# Patient Record
Sex: FEMALE | Race: WHITE | NOT HISPANIC OR LATINO | Employment: UNEMPLOYED | URBAN - METROPOLITAN AREA
[De-identification: names, ages, dates, MRNs, and addresses within clinical notes are randomized per-mention and may not be internally consistent; named-entity substitution may affect disease eponyms.]

---

## 2022-06-15 ENCOUNTER — OFFICE VISIT (OUTPATIENT)
Dept: DERMATOLOGY | Facility: CLINIC | Age: 52
End: 2022-06-15
Payer: COMMERCIAL

## 2022-06-15 DIAGNOSIS — L81.1 MELASMA: ICD-10-CM

## 2022-06-15 DIAGNOSIS — L81.4 LENTIGO: Primary | ICD-10-CM

## 2022-06-15 DIAGNOSIS — I83.91 SPIDER VEIN OF RIGHT LOWER EXTREMITY: ICD-10-CM

## 2022-06-15 PROCEDURE — 99204 OFFICE O/P NEW MOD 45 MIN: CPT | Mod: S$GLB,,, | Performed by: DERMATOLOGY

## 2022-06-15 PROCEDURE — 99999 PR PBB SHADOW E&M-NEW PATIENT-LVL III: ICD-10-PCS | Mod: PBBFAC,,, | Performed by: DERMATOLOGY

## 2022-06-15 PROCEDURE — 1160F RVW MEDS BY RX/DR IN RCRD: CPT | Mod: CPTII,S$GLB,, | Performed by: DERMATOLOGY

## 2022-06-15 PROCEDURE — 1160F PR REVIEW ALL MEDS BY PRESCRIBER/CLIN PHARMACIST DOCUMENTED: ICD-10-PCS | Mod: CPTII,S$GLB,, | Performed by: DERMATOLOGY

## 2022-06-15 PROCEDURE — 1159F MED LIST DOCD IN RCRD: CPT | Mod: CPTII,S$GLB,, | Performed by: DERMATOLOGY

## 2022-06-15 PROCEDURE — 99204 PR OFFICE/OUTPT VISIT, NEW, LEVL IV, 45-59 MIN: ICD-10-PCS | Mod: S$GLB,,, | Performed by: DERMATOLOGY

## 2022-06-15 PROCEDURE — 1159F PR MEDICATION LIST DOCUMENTED IN MEDICAL RECORD: ICD-10-PCS | Mod: CPTII,S$GLB,, | Performed by: DERMATOLOGY

## 2022-06-15 PROCEDURE — 99999 PR PBB SHADOW E&M-NEW PATIENT-LVL III: CPT | Mod: PBBFAC,,, | Performed by: DERMATOLOGY

## 2022-06-15 RX ORDER — FAMOTIDINE 10 MG/1
TABLET ORAL
COMMUNITY

## 2022-06-15 RX ORDER — CHOLECALCIFEROL (VITAMIN D3) 50 MCG
TABLET ORAL
COMMUNITY

## 2022-06-15 RX ORDER — FLUTICASONE PROPIONATE 50 MCG
SPRAY, SUSPENSION (ML) NASAL
COMMUNITY

## 2022-06-15 RX ORDER — ESTRADIOL 0.1 MG/G
CREAM VAGINAL
COMMUNITY

## 2022-06-15 RX ORDER — UBIDECARENONE 75 MG
CAPSULE ORAL
COMMUNITY

## 2022-06-15 RX ORDER — IBUPROFEN 100 MG/5ML
SUSPENSION, ORAL (FINAL DOSE FORM) ORAL
COMMUNITY

## 2022-06-15 NOTE — PROGRESS NOTES
"  Subjective:       Patient ID:  Cookie Warren is a 51 y.o. female who presents for   Chief Complaint   Patient presents with    Spot     FACE     Pt c/o brown spots to face x 2-3yrs. Prev tx with OTC lightening cream. Does wear spf but has a lot of sun exposure now     Pt also c/o "bad veins" to legs she would like to have looked at.    Pt also states she had rash in her scalp that has now resolved. Believes it was from a hair dye she had prev used. It caused hives around 12/2021 it has resolved.  Uses sensitive skin products now.     Spot        Review of Systems   Skin: Positive for daily sunscreen use and activity-related sunscreen use. Negative for tendency to form keloidal scars.   Hematologic/Lymphatic: Bruises/bleeds easily (bruise).        Objective:    Physical Exam   Constitutional: She appears well-developed and well-nourished. No distress.   Neurological: She is alert and oriented to person, place, and time. She is not disoriented.   Psychiatric: She has a normal mood and affect.   Skin:   Areas Examined (abnormalities noted in diagram):   Head / Face Inspection Performed  RLE Inspected  LLE Inspection Performed                   Diagram Legend     Erythematous scaling macule/papule c/w actinic keratosis       Vascular papule c/w angioma      Pigmented verrucoid papule/plaque c/w seborrheic keratosis      Yellow umbilicated papule c/w sebaceous hyperplasia      Irregularly shaped tan macule c/w lentigo     1-2 mm smooth white papules consistent with Milia      Movable subcutaneous cyst with punctum c/w epidermal inclusion cyst      Subcutaneous movable cyst c/w pilar cyst      Firm pink to brown papule c/w dermatofibroma      Pedunculated fleshy papule(s) c/w skin tag(s)      Evenly pigmented macule c/w junctional nevus     Mildly variegated pigmented, slightly irregular-bordered macule c/w mildly atypical nevus      Flesh colored to evenly pigmented papule c/w intradermal nevus       Pink pearly " papule/plaque c/w basal cell carcinoma      Erythematous hyperkeratotic cursted plaque c/w SCC      Surgical scar with no sign of skin cancer recurrence      Open and closed comedones      Inflammatory papules and pustules      Verrucoid papule consistent consistent with wart     Erythematous eczematous patches and plaques     Dystrophic onycholytic nail with subungual debris c/w onychomycosis     Umbilicated papule    Erythematous-base heme-crusted tan verrucoid plaque consistent with inflamed seborrheic keratosis     Erythematous Silvery Scaling Plaque c/w Psoriasis     See annotation      Assessment / Plan:        Lentigo  This is a benign hyperpigmented sun induced lesion. Recommend daily sun protection/avoidance and use of at least SPF 30, broad spectrum sunscreen (OTC drug) will reduce the number of new lesions. Treatment of these benign lesions are considered cosmetic.    The nature of sun-induced photo-aging and skin cancers is discussed.  Sun avoidance, protective clothing, and the use of 30-SPF sunscreens is advised. Observe closely for skin damage/changes, and call if such occurs.    Melasma  -     hydroquinone 8 % Emul; Compound hydroquinone 8% / tretinoin 0.025% / kojic acid 1% / niacinamide 4% / fluocinolone 0.025% cream. Apply a pea-sized amount to face qhs. Do not use for longer than 12 weeks in a row.  Dispense: 30 g; Refill: 1  PM:  1. Wash with Revision skin brightening wash or other glycolic acid wash such as Glytone- many at [facility] available over the counter  2. Thin film of hydroquinone compound on every other to every night   3. Moisturize with cerave pm or vanicream daily moisturizer to minimize irritation    AM:  1. Wash with mild cleanser like vanicream daily facial cleanser or cerave hydrating cleanser  2. Vitamin C serum over face- Revision, [facility], or OTC Cerave or LaRoche Posay Vitamin C serum  3. Moisturizer with spf 30+     A tint is recommended too- such as makeup, tinted  sunscreen, BB/CC creams. The tint in products protects against agents other than UV light that damage our skin such as blue light from screens, infrared heat, visible light, and other  other environmental pollutants.      Spider vein of right lower extremity  Refer to [facility] or [facility]           Follow up in about 3 months (around 9/15/2022) for recheck face.

## 2022-06-15 NOTE — PATIENT INSTRUCTIONS
PM:  Wash with Revision skin brightening wash or other glycolic acid wash such as Glytone- many at [facility] available over the counter  Thin film of hydroquinone compound on every other to every night   Moisturize with cerave pm or vanicream daily moisturizer to minimize irritation    AM:  Wash with mild cleanser like vanicream daily facial cleanser or cerave hydrating cleanser  2. Vitamin C serum over face- Revision, [employer], or OTC Cerave or LaRoche Posay Vitamin C serum  3. Moisturizer with spf 30+     A tint is recommended too- such as makeup, tinted sunscreen, BB/CC creams. The tint in products protects against agents other than UV light that damage our skin such as blue light from screens, infrared heat, visible light, and other  other environmental pollutants.

## 2022-09-15 ENCOUNTER — OFFICE VISIT (OUTPATIENT)
Dept: DERMATOLOGY | Facility: CLINIC | Age: 52
End: 2022-09-15
Payer: COMMERCIAL

## 2022-09-15 DIAGNOSIS — L81.1 MELASMA: Primary | ICD-10-CM

## 2022-09-15 PROCEDURE — 99214 PR OFFICE/OUTPT VISIT, EST, LEVL IV, 30-39 MIN: ICD-10-PCS | Mod: S$GLB,,, | Performed by: DERMATOLOGY

## 2022-09-15 PROCEDURE — 99999 PR PBB SHADOW E&M-EST. PATIENT-LVL III: CPT | Mod: PBBFAC,,, | Performed by: DERMATOLOGY

## 2022-09-15 PROCEDURE — 99999 PR PBB SHADOW E&M-EST. PATIENT-LVL III: ICD-10-PCS | Mod: PBBFAC,,, | Performed by: DERMATOLOGY

## 2022-09-15 PROCEDURE — 99214 OFFICE O/P EST MOD 30 MIN: CPT | Mod: S$GLB,,, | Performed by: DERMATOLOGY

## 2022-09-15 PROCEDURE — 1159F MED LIST DOCD IN RCRD: CPT | Mod: CPTII,S$GLB,, | Performed by: DERMATOLOGY

## 2022-09-15 PROCEDURE — 1159F PR MEDICATION LIST DOCUMENTED IN MEDICAL RECORD: ICD-10-PCS | Mod: CPTII,S$GLB,, | Performed by: DERMATOLOGY

## 2022-09-15 PROCEDURE — 1160F RVW MEDS BY RX/DR IN RCRD: CPT | Mod: CPTII,S$GLB,, | Performed by: DERMATOLOGY

## 2022-09-15 PROCEDURE — 1160F PR REVIEW ALL MEDS BY PRESCRIBER/CLIN PHARMACIST DOCUMENTED: ICD-10-PCS | Mod: CPTII,S$GLB,, | Performed by: DERMATOLOGY

## 2022-09-15 RX ORDER — TRETINOIN 0.25 MG/G
CREAM TOPICAL
Qty: 30 G | Refills: 5 | Status: SHIPPED | OUTPATIENT
Start: 2022-09-15

## 2022-09-15 NOTE — PROGRESS NOTES
Subjective:       Patient ID:  Cookie Warren is a 52 y.o. female who presents for   Chief Complaint   Patient presents with    Follow-up     Pt here for f/u of brown spots to face which she has had for 2-3 years. . Area is doing better and pt is following regimen. Pt washing with glytone , using [facility] fade cream with HQ and sunscreen with tint in am and VIt C lotion         Follow-up  Pt here for f/u of brown spots to face. Area is doing better and pt is following regimen.     Review of Systems   Skin:  Positive for daily sunscreen use and activity-related sunscreen use. Negative for recent sunburn and wears hat.      Objective:    Physical Exam   Constitutional: She appears well-developed and well-nourished. No distress.   Neurological: She is alert and oriented to person, place, and time. She is not disoriented.   Psychiatric: She has a normal mood and affect.   Skin:   Areas Examined (abnormalities noted in diagram):   Head / Face Inspection Performed            Diagram Legend     Erythematous scaling macule/papule c/w actinic keratosis       Vascular papule c/w angioma      Pigmented verrucoid papule/plaque c/w seborrheic keratosis      Yellow umbilicated papule c/w sebaceous hyperplasia      Irregularly shaped tan macule c/w lentigo     1-2 mm smooth white papules consistent with Milia      Movable subcutaneous cyst with punctum c/w epidermal inclusion cyst      Subcutaneous movable cyst c/w pilar cyst      Firm pink to brown papule c/w dermatofibroma      Pedunculated fleshy papule(s) c/w skin tag(s)      Evenly pigmented macule c/w junctional nevus     Mildly variegated pigmented, slightly irregular-bordered macule c/w mildly atypical nevus      Flesh colored to evenly pigmented papule c/w intradermal nevus       Pink pearly papule/plaque c/w basal cell carcinoma      Erythematous hyperkeratotic cursted plaque c/w SCC      Surgical scar with no sign of skin cancer recurrence      Open and closed  comedones      Inflammatory papules and pustules      Verrucoid papule consistent consistent with wart     Erythematous eczematous patches and plaques     Dystrophic onycholytic nail with subungual debris c/w onychomycosis     Umbilicated papule    Erythematous-base heme-crusted tan verrucoid plaque consistent with inflamed seborrheic keratosis     Erythematous Silvery Scaling Plaque c/w Psoriasis     See annotation      Assessment / Plan:        Melasma  D/C hydroquinone formulation for now as has used for 3 months consistently. Will maintain with below meds and resume HQ if needed in a few months if repigmentation occurs.   -     tretinoin (RETIN-A) 0.025 % cream; Compound tretinoin 0.025% / azelaic acid 8% / niacinamide 2% cream. Apply a pea-sized amount to entire face qhs.  Dispense: 30 g; Refill: 5  PM:  Wash with Revision skin brightening wash or other glycolic acid wash such as Glytone- many at [facility] available over the counter  Thin film of tretinoin/azaleic acid  all over every other to every night   Moisturize with cerave pm or vanicream daily moisturizer to minimize irritation    AM:  Wash with mild cleanser like vanicream daily facial cleanser or cerave hydrating cleanser  2. Vitamin C serum over face- Revision, [employer], or OTC Cerave or LaRoche Posay Vitamin C serum  3. Moisturizer with spf 30+     A tint is recommended too- such as makeup, tinted sunscreen, BB/CC creams. The tint in products protects against agents other than UV light that damage our skin such as blue light from screens, infrared heat, visible light, and other  other environmental pollutants.             Follow up in about 6 months (around 3/15/2023) for recheck face .

## 2022-09-15 NOTE — PATIENT INSTRUCTIONS
PM:  Wash with Revision skin brightening wash or other glycolic acid wash such as Glytone- many at [facility] available over the counter  Thin film of tretinoin/azaleic acid  all over every other to every night   Moisturize with cerave pm or vanicream daily moisturizer to minimize irritation    AM:  Wash with mild cleanser like vanicream daily facial cleanser or cerave hydrating cleanser  2. Vitamin C serum over face- Revision, [employer], or OTC Cerave or LaRoche Posay Vitamin C serum  3. Moisturizer with spf 30+     A tint is recommended too- such as makeup, tinted sunscreen, BB/CC creams. The tint in products protects against agents other than UV light that damage our skin such as blue light from screens, infrared heat, visible light, and other  other environmental pollutants.

## 2023-02-20 ENCOUNTER — PATIENT MESSAGE (OUTPATIENT)
Dept: DERMATOLOGY | Facility: CLINIC | Age: 53
End: 2023-02-20
Payer: COMMERCIAL

## 2023-02-27 ENCOUNTER — PATIENT MESSAGE (OUTPATIENT)
Dept: DERMATOLOGY | Facility: CLINIC | Age: 53
End: 2023-02-27
Payer: COMMERCIAL

## 2023-06-08 ENCOUNTER — OFFICE VISIT (OUTPATIENT)
Dept: DERMATOLOGY | Facility: CLINIC | Age: 53
End: 2023-06-08
Payer: COMMERCIAL

## 2023-06-08 DIAGNOSIS — L81.1 MELASMA: Primary | ICD-10-CM

## 2023-06-08 DIAGNOSIS — L70.0 ACNE VULGARIS: ICD-10-CM

## 2023-06-08 PROCEDURE — 99214 PR OFFICE/OUTPT VISIT, EST, LEVL IV, 30-39 MIN: ICD-10-PCS | Mod: S$GLB,,, | Performed by: DERMATOLOGY

## 2023-06-08 PROCEDURE — 1160F PR REVIEW ALL MEDS BY PRESCRIBER/CLIN PHARMACIST DOCUMENTED: ICD-10-PCS | Mod: CPTII,S$GLB,, | Performed by: DERMATOLOGY

## 2023-06-08 PROCEDURE — 99999 PR PBB SHADOW E&M-EST. PATIENT-LVL III: ICD-10-PCS | Mod: PBBFAC,,, | Performed by: DERMATOLOGY

## 2023-06-08 PROCEDURE — 99214 OFFICE O/P EST MOD 30 MIN: CPT | Mod: S$GLB,,, | Performed by: DERMATOLOGY

## 2023-06-08 PROCEDURE — 1159F MED LIST DOCD IN RCRD: CPT | Mod: CPTII,S$GLB,, | Performed by: DERMATOLOGY

## 2023-06-08 PROCEDURE — 1159F PR MEDICATION LIST DOCUMENTED IN MEDICAL RECORD: ICD-10-PCS | Mod: CPTII,S$GLB,, | Performed by: DERMATOLOGY

## 2023-06-08 PROCEDURE — 1160F RVW MEDS BY RX/DR IN RCRD: CPT | Mod: CPTII,S$GLB,, | Performed by: DERMATOLOGY

## 2023-06-08 PROCEDURE — 99999 PR PBB SHADOW E&M-EST. PATIENT-LVL III: CPT | Mod: PBBFAC,,, | Performed by: DERMATOLOGY

## 2023-06-08 NOTE — PATIENT INSTRUCTIONS
Hydroquinone product from [facility] - use every other night for 3 months then take  a 2-3 month break  Product from [facility] can be used alternating with [facility] hydroquinone and then used regularly for maintenance      PM:  Wash with Revision skin brightening wash or other glycolic acid wash such as Glytone- many at [facility] available over the counter  Thin film of Hydroquinone from  or tretinoin from [facility] all over every other to every night   Moisturize with cerave pm or vanicream daily moisturizer to minimize irritation    AM:  Wash with mild cleanser like vanicream daily facial cleanser or cerave hydrating cleanser  2. Vitamin C serum over face- Revision, Skin [employer], or OTC Cerave or LaRoche Posay Vitamin C serum  3. Moisturizer with spf 30+     A tint is recommended too- such as makeup, tinted sunscreen, BB/CC creams. The tint in products protects against agents other than UV light that damage our skin such as blue light from screens, infrared heat, visible light, and other  other environmental pollutants.

## 2023-06-08 NOTE — PROGRESS NOTES
Subjective:      Patient ID:  Cookie Warren is a 52 y.o. female who presents for   Chief Complaint   Patient presents with    Follow-up     Patient is here today for follow up to recheck face. Pt states that sun spots has remained the same but overall skin has improved significantly. Pt states that she has been following the regimen given.   Pt is using tretinoin 0.025% cream/azeliec acid 8%/niacinamide regularly.  Was on HQ then stopped and is maintained on prev rx.     Review of Systems   Skin:  Positive for daily sunscreen use, activity-related sunscreen use and recent sunburn. Negative for wears hat.     Objective:   Physical Exam   Constitutional: She appears well-developed and well-nourished. No distress.   Neurological: She is alert and oriented to person, place, and time. She is not disoriented.   Psychiatric: She has a normal mood and affect.   Skin:   Areas Examined (abnormalities noted in diagram):   Head / Face Inspection Performed          Diagram Legend     Erythematous scaling macule/papule c/w actinic keratosis       Vascular papule c/w angioma      Pigmented verrucoid papule/plaque c/w seborrheic keratosis      Yellow umbilicated papule c/w sebaceous hyperplasia      Irregularly shaped tan macule c/w lentigo     1-2 mm smooth white papules consistent with Milia      Movable subcutaneous cyst with punctum c/w epidermal inclusion cyst      Subcutaneous movable cyst c/w pilar cyst      Firm pink to brown papule c/w dermatofibroma      Pedunculated fleshy papule(s) c/w skin tag(s)      Evenly pigmented macule c/w junctional nevus     Mildly variegated pigmented, slightly irregular-bordered macule c/w mildly atypical nevus      Flesh colored to evenly pigmented papule c/w intradermal nevus       Pink pearly papule/plaque c/w basal cell carcinoma      Erythematous hyperkeratotic cursted plaque c/w SCC      Surgical scar with no sign of skin cancer recurrence      Open and closed comedones       Inflammatory papules and pustules      Verrucoid papule consistent consistent with wart     Erythematous eczematous patches and plaques     Dystrophic onycholytic nail with subungual debris c/w onychomycosis     Umbilicated papule    Erythematous-base heme-crusted tan verrucoid plaque consistent with inflamed seborrheic keratosis     Erythematous Silvery Scaling Plaque c/w Psoriasis     See annotation      Assessment / Plan:        Melasma  Acne vulgaris  Hydroquinone product from [facility] - use every other night for 3 months then take  a 2-3 month break  Product from [facility] can be used alternating with [facility] hydroquinone and then used regularly for maintenance      PM:  Wash with Revision skin brightening wash or other glycolic acid wash such as Glytone- many at [facility] available over the counter  Thin film of Hydroquinone from  or tretinoin from [facility] all over every other to every night - [facility] #26- hydroquinone 8%/tretinoin 0.025%  Moisturize with cerave pm or vanicream daily moisturizer to minimize irritation    AM:  Wash with mild cleanser like vanicream daily facial cleanser or cerave hydrating cleanser  2. Vitamin C serum over face- Revision, [employer], or OTC Cerave or LaRoche Posay Vitamin C serum  3. Moisturizer with spf 30+     A tint is recommended too- such as makeup, tinted sunscreen, BB/CC creams. The tint in products protects against agents other than UV light that damage our skin such as blue light from screens, infrared heat, visible light, and other  other environmental pollutants.               Follow up in about 1 year (around 6/8/2024) for Medication Management.

## 2024-06-13 ENCOUNTER — OFFICE VISIT (OUTPATIENT)
Dept: DERMATOLOGY | Facility: CLINIC | Age: 54
End: 2024-06-13
Payer: COMMERCIAL

## 2024-06-13 DIAGNOSIS — L81.1 MELASMA: ICD-10-CM

## 2024-06-13 DIAGNOSIS — L71.9 ROSACEA: Primary | ICD-10-CM

## 2024-06-13 PROCEDURE — 1160F RVW MEDS BY RX/DR IN RCRD: CPT | Mod: CPTII,S$GLB,, | Performed by: DERMATOLOGY

## 2024-06-13 PROCEDURE — 99214 OFFICE O/P EST MOD 30 MIN: CPT | Mod: S$GLB,,, | Performed by: DERMATOLOGY

## 2024-06-13 PROCEDURE — 99999 PR PBB SHADOW E&M-EST. PATIENT-LVL III: CPT | Mod: PBBFAC,,, | Performed by: DERMATOLOGY

## 2024-06-13 PROCEDURE — 1159F MED LIST DOCD IN RCRD: CPT | Mod: CPTII,S$GLB,, | Performed by: DERMATOLOGY

## 2024-06-13 RX ORDER — TRETINOIN 0.25 MG/G
CREAM TOPICAL
Qty: 30 G | Refills: 5 | Status: SHIPPED | OUTPATIENT
Start: 2024-06-13

## 2024-06-13 RX ORDER — IVERMECTIN 10 MG/G
CREAM TOPICAL
Qty: 30 G | Refills: 5 | Status: SHIPPED | OUTPATIENT
Start: 2024-06-13

## 2024-06-13 NOTE — PATIENT INSTRUCTIONS
PM:  Wash with revision skin brightening or glycolic acid wash from [facility]  Thin film of Tret/azaleic acid  all over every other to every night   Moisturize with cerave pm or vanicream daily moisturizer to minimize irritation    AM:  Wash with mild cleanser  2. Spot treat with soolantra/metrocream  Vitamin C serum  3. Moisturizer with spf 30 +     A tint is recommended too- such as makeup, tinted sunscreen, BB/CC creams. The iron oxide in the tint of products protects against agents other than UV light that damage our skin such as blue light from screens, infrared heat, visible light, and other other environmental pollutants.  These other factors can contribute significantly to irregular pigment, especially in skin of color and tint helps protect from these factors.

## 2024-06-13 NOTE — PROGRESS NOTES
Subjective:      Patient ID:  Cookie Warren is a 53 y.o. female who presents for   Chief Complaint   Patient presents with    Follow-up     Melasma      Pt here today for melasma f/u - overall better per pt  Pt also states she was told she has rosacea by another derm provider has been using Opzelura and soolantra in AM as well  Pt states she needs refills on hydroquinone    PM:  1. Wash with Revision skin brightening wash or other glycolic acid wash such as Glytone- many at [facility] available over the counter  2. Thin film of Hydroquinone from [facility] or tretinoin from [facility] all over every other to every night   3. Moisturize with cerave pm or vanicream daily moisturizer to minimize irritation     AM:  1. Wash with mild cleanser like vanicream daily facial cleanser or cerave hydrating cleanser  2. Vitamin C serum over face- Revision, [employer], or OTC Cerave or LaRoche Posay Vitamin C serum  3. Moisturizer with spf 30+      A tint is recommended too- such as makeup, tinted sunscreen, BB/CC creams.          Follow-up      Review of Systems   Skin:  Positive for daily sunscreen use and activity-related sunscreen use. Negative for recent sunburn and wears hat.       Objective:   Physical Exam   Constitutional: She appears well-developed and well-nourished. No distress.   Neurological: She is alert and oriented to person, place, and time. She is not disoriented.   Psychiatric: She has a normal mood and affect.   Skin:   Areas Examined (abnormalities noted in diagram):   Head / Face Inspection Performed            Diagram Legend     Erythematous scaling macule/papule c/w actinic keratosis       Vascular papule c/w angioma      Pigmented verrucoid papule/plaque c/w seborrheic keratosis      Yellow umbilicated papule c/w sebaceous hyperplasia      Irregularly shaped tan macule c/w lentigo     1-2 mm smooth white papules consistent with Milia      Movable subcutaneous cyst with punctum c/w epidermal inclusion cyst       Subcutaneous movable cyst c/w pilar cyst      Firm pink to brown papule c/w dermatofibroma      Pedunculated fleshy papule(s) c/w skin tag(s)      Evenly pigmented macule c/w junctional nevus     Mildly variegated pigmented, slightly irregular-bordered macule c/w mildly atypical nevus      Flesh colored to evenly pigmented papule c/w intradermal nevus       Pink pearly papule/plaque c/w basal cell carcinoma      Erythematous hyperkeratotic cursted plaque c/w SCC      Surgical scar with no sign of skin cancer recurrence      Open and closed comedones      Inflammatory papules and pustules      Verrucoid papule consistent consistent with wart     Erythematous eczematous patches and plaques     Dystrophic onycholytic nail with subungual debris c/w onychomycosis     Umbilicated papule    Erythematous-base heme-crusted tan verrucoid plaque consistent with inflamed seborrheic keratosis     Erythematous Silvery Scaling Plaque c/w Psoriasis     See annotation      Assessment / Plan:        Rosacea  -Melasma  -     tretinoin (RETIN-A) 0.025 % cream; Compound tretinoin 0.025% / azelaic acid 8% / niacinamide 2% cream. Apply a pea-sized amount to entire face qhs.  Dispense: 30 g; Refill: 5  PM:  Wash with revision skin brightening or glycolic acid wash from [facility]  Thin film of Tret/azaleic acid  all over every other to every night   Moisturize with cerave pm or vanicream daily moisturizer to minimize irritation    AM:  Wash with mild cleanser  2. Spot treat with soolantra/metrocream  Vitamin C serum  3. Moisturizer with spf 30 +     A tint is recommended too- such as makeup, tinted sunscreen, BB/CC creams. The iron oxide in the tint of products protects against agents other than UV light that damage our skin such as blue light from screens, infrared heat, visible light, and other other environmental pollutants.  These other factors can contribute significantly to irregular pigment, especially in skin of color and tint helps  protect from these factors.      Use Opzelura just for facial rashes, not for rosacea or melasma           Follow up in about 5 months (around 11/13/2024) for Medication Management. Resume HQ in fall if needed

## 2024-10-09 ENCOUNTER — OFFICE VISIT (OUTPATIENT)
Dept: DERMATOLOGY | Facility: CLINIC | Age: 54
End: 2024-10-09
Payer: COMMERCIAL

## 2024-10-09 DIAGNOSIS — L81.1 MELASMA: ICD-10-CM

## 2024-10-09 DIAGNOSIS — L71.9 ROSACEA: Primary | ICD-10-CM

## 2024-10-09 PROCEDURE — 1160F RVW MEDS BY RX/DR IN RCRD: CPT | Mod: CPTII,S$GLB,, | Performed by: DERMATOLOGY

## 2024-10-09 PROCEDURE — 99999 PR PBB SHADOW E&M-EST. PATIENT-LVL III: CPT | Mod: PBBFAC,,, | Performed by: DERMATOLOGY

## 2024-10-09 PROCEDURE — 99213 OFFICE O/P EST LOW 20 MIN: CPT | Mod: S$GLB,,, | Performed by: DERMATOLOGY

## 2024-10-09 PROCEDURE — 1159F MED LIST DOCD IN RCRD: CPT | Mod: CPTII,S$GLB,, | Performed by: DERMATOLOGY

## 2024-10-09 NOTE — PATIENT INSTRUCTIONS
PM:  Wash with Revision skin brightening wash or other glycolic acid wash such as Glytone- many at [facility] available over the counter  Thin film of OTC retinol  all over every other to every night   Moisturize with cerave pm or vanicream daily moisturizer to minimize irritation    AM:  Wash with mild cleanser like vanicream daily facial cleanser or cerave hydrating cleanser  2. Vitamin C serum over face- Revision, [employer], or OTC Cerave or LaRoche Posay Vitamin C serum  Can spot treat with soolantra/metronidazole  3. Moisturizer with spf 30+     A tint is recommended too- such as makeup, tinted sunscreen, BB/CC creams. The tint in products protects against agents other than UV light that damage our skin such as blue light from screens, infrared heat, visible light, and other  other environmental pollutants.        Plan to increase to plain tretinoin ( no azaleic acid) if tolerating OTC retinol

## 2024-10-09 NOTE — PROGRESS NOTES
Subjective:      Patient ID:  Cookie Warren is a 54 y.o. female who presents for   Chief Complaint   Patient presents with    Follow-up     Rosacea - better  Melasma - same      Pt here today for rosacea and melasma f/u. Pt states rosacea is better, melasma is the same. Pt states the tret/azaleic acid flares her rosacea. So now she does not have anything to treat the brown areas.     PM:  1. Wash with glycolic acid wash from [employer]  2. Moisturize with cerave pm or vanicream daily moisturizer to minimize irritation     AM:  1. Wash with mild cleanser  2. Spot treat with soolantra/metrocream- if needed   Vitamin C serum  3. Moisturizer with spf 30 +      Pt states she does use the opzelura if she has a flare and it helps.     Follow-up      Review of Systems   Skin:  Positive for daily sunscreen use and activity-related sunscreen use. Negative for recent sunburn and wears hat.       Objective:   Physical Exam   Constitutional: She appears well-developed and well-nourished. No distress.   Neurological: She is alert and oriented to person, place, and time. She is not disoriented.   Psychiatric: She has a normal mood and affect.   Skin:   Areas Examined (abnormalities noted in diagram):   Head / Face Inspection Performed            Diagram Legend     Erythematous scaling macule/papule c/w actinic keratosis       Vascular papule c/w angioma      Pigmented verrucoid papule/plaque c/w seborrheic keratosis      Yellow umbilicated papule c/w sebaceous hyperplasia      Irregularly shaped tan macule c/w lentigo     1-2 mm smooth white papules consistent with Milia      Movable subcutaneous cyst with punctum c/w epidermal inclusion cyst      Subcutaneous movable cyst c/w pilar cyst      Firm pink to brown papule c/w dermatofibroma      Pedunculated fleshy papule(s) c/w skin tag(s)      Evenly pigmented macule c/w junctional nevus     Mildly variegated pigmented, slightly irregular-bordered macule c/w mildly atypical nevus       Flesh colored to evenly pigmented papule c/w intradermal nevus       Pink pearly papule/plaque c/w basal cell carcinoma      Erythematous hyperkeratotic cursted plaque c/w SCC      Surgical scar with no sign of skin cancer recurrence      Open and closed comedones      Inflammatory papules and pustules      Verrucoid papule consistent consistent with wart     Erythematous eczematous patches and plaques     Dystrophic onycholytic nail with subungual debris c/w onychomycosis     Umbilicated papule    Erythematous-base heme-crusted tan verrucoid plaque consistent with inflamed seborrheic keratosis     Erythematous Silvery Scaling Plaque c/w Psoriasis     See annotation      Assessment / Plan:        Rosacea  Melasma  PM:  Wash with Revision skin brightening wash or other glycolic acid wash such as Glytone- many at [employer] available over the counter  Thin film of OTC retinol  all over every other to every night   Moisturize with cerave pm or vanicream daily moisturizer to minimize irritation    AM:  Wash with mild cleanser like vanicream daily facial cleanser or cerave hydrating cleanser  2. Vitamin C serum over face- Revision, Skin Medica, or OTC Cerave or LaRoche Posay Vitamin C serum  Can spot treat with soolantra/metronidazole  3. Moisturizer with spf 30+     A tint is recommended too- such as makeup, tinted sunscreen, BB/CC creams. The tint in products protects against agents other than UV light that damage our skin such as blue light from screens, infrared heat, visible light, and other  other environmental pollutants.        Plan to increase to plain tretinoin ( no azaleic acid) if tolerating OTC retinol            Follow up in about 1 year (around 10/9/2025) for Medication Management.